# Patient Record
Sex: FEMALE | Race: OTHER | URBAN - METROPOLITAN AREA
[De-identification: names, ages, dates, MRNs, and addresses within clinical notes are randomized per-mention and may not be internally consistent; named-entity substitution may affect disease eponyms.]

---

## 2024-03-20 ENCOUNTER — OFFICE (OUTPATIENT)
Dept: URBAN - METROPOLITAN AREA CLINIC 103 | Facility: CLINIC | Age: 64
Setting detail: OPHTHALMOLOGY
End: 2024-03-20
Payer: MEDICARE

## 2024-03-20 DIAGNOSIS — H25.13: ICD-10-CM

## 2024-03-20 DIAGNOSIS — H16.223: ICD-10-CM

## 2024-03-20 DIAGNOSIS — H40.033: ICD-10-CM

## 2024-03-20 DIAGNOSIS — H35.3131: ICD-10-CM

## 2024-03-20 DIAGNOSIS — H52.7: ICD-10-CM

## 2024-03-20 DIAGNOSIS — H43.813: ICD-10-CM

## 2024-03-20 PROCEDURE — 92014 COMPRE OPH EXAM EST PT 1/>: CPT | Performed by: OPHTHALMOLOGY

## 2024-03-20 PROCEDURE — 92134 CPTRZ OPH DX IMG PST SGM RTA: CPT | Performed by: OPHTHALMOLOGY

## 2024-03-20 PROCEDURE — 92015 DETERMINE REFRACTIVE STATE: CPT | Performed by: OPHTHALMOLOGY

## 2024-03-20 PROCEDURE — 92020 GONIOSCOPY: CPT | Performed by: OPHTHALMOLOGY

## 2024-03-20 ASSESSMENT — REFRACTION_CURRENTRX
OS_VPRISM_DIRECTION: SV
OS_CYLINDER: -0.25
OD_SPHERE: +1.50
OS_OVR_VA: 20/
OD_VPRISM_DIRECTION: SV
OD_OVR_VA: 20/
OS_AXIS: 017
OS_SPHERE: +1.75

## 2024-03-20 ASSESSMENT — REFRACTION_MANIFEST
OS_SPHERE: +1.50
OD_CYLINDER: -0.50
OS_CYLINDER: SPHERE
OD_VA1: 20/20
OS_ADD: +2.50
OU_VA: 20/20
OD_VA2: 20/20
OD_AXIS: 090
OS_CYLINDER: SPHERE
OS_SPHERE: +1.75
OS_VA2: 20/20
OD_CYLINDER: SPHERE
OS_VA1: 20/20
OD_SPHERE: +1.75
OD_ADD: +2.50
OS_VA1: 20/25
OS_ADD: +2.75
OD_ADD: +2.75
OD_VA1: 20/25
OD_SPHERE: +1.75

## 2024-03-20 ASSESSMENT — SPHEQUIV_DERIVED: OD_SPHEQUIV: 1.5

## 2024-04-10 PROBLEM — Z00.00 ENCOUNTER FOR PREVENTIVE HEALTH EXAMINATION: Status: ACTIVE | Noted: 2024-04-10

## 2024-04-11 ENCOUNTER — APPOINTMENT (OUTPATIENT)
Dept: ORTHOPEDIC SURGERY | Facility: CLINIC | Age: 64
End: 2024-04-11
Payer: MEDICARE

## 2024-04-11 DIAGNOSIS — Z78.9 OTHER SPECIFIED HEALTH STATUS: ICD-10-CM

## 2024-04-11 DIAGNOSIS — S83.282A OTHER TEAR OF LATERAL MENISCUS, CURRENT INJURY, LEFT KNEE, INITIAL ENCOUNTER: ICD-10-CM

## 2024-04-11 PROCEDURE — 20610 DRAIN/INJ JOINT/BURSA W/O US: CPT | Mod: LT

## 2024-04-11 PROCEDURE — 99203 OFFICE O/P NEW LOW 30 MIN: CPT | Mod: 25

## 2024-04-11 NOTE — HISTORY OF PRESENT ILLNESS
[8] : 8 [5] : 5 [Sharp] : sharp [Constant] : constant [Leisure] : leisure [Rest] : rest [Walking] : walking [de-identified] : Patient is here today for the left knee. States her knee has been bothering her for 6 months NKI. Reports a sharp lateral pain on her left knee. Patient states she is constantly in pain. Patient has ZP Xray from 02/02/2024. Denies any pervious treatment for her knee.  [] : Patient is currently injured and not playing sports: no [FreeTextEntry1] : left knee

## 2024-04-11 NOTE — PHYSICAL EXAM
[de-identified] : Examination of the left knee is as follows: INSPECTION: mild effusion, but no ecchymosis, no erythema, no atrophy, no deformities of quad tendon or of patellar tendon PALPATION: lateral joint line tenderness, but no palpable mass, no obvious defects, no increased warmth, no calf tenderness ROM: flexion 125 degrees, but extension 0 degrees STRENGTH: quadriceps 5/5, hamstring 5/5 TESTING: ligamentously stable NEURO: light touch is intact throughout GAIT: antalgic, but patient ambulates without assistive device  X-rays of the left knee is as follows:  Knee 3 view in the anteroposterior, lateral, skyline views: joints preserved, no acute osseous abnormalities.

## 2024-04-11 NOTE — ASSESSMENT
[FreeTextEntry1] : 62yo female with left knee lateral joint line pain for past 6 months with mechanical symptoms of locking, clicking.  Recommend nonsurgical management with CSI today and PT rx.  -Discussed risks, benefits, alternatives of left knee intraarticular CSI, patient tolerated well Large joint injection was performed of the left knee. The indication for this procedure was pain, inflammation and x-ray evidence of Osteoarthritis on this or prior visit. The site was prepped with alcohol. An injection of Betamethasone (Celestone) 1cc of 3mg, Lidocaine 7cc of 1%  was used. Patient tolerated procedure well. Patient was advised to call if redness, pain or fever occur and apply ice for 15 minutes out of every hour for the next 12-24 hours as tolerated. Patient has tried OTC's including aspirin, Ibuprofen, Aleve, etc or prescription NSAIDS, and/or exercises at home and/or physical therapy without satisfactory response, patient had decreased mobility in the joint and the risks benefits, and alternatives have been discussed, and verbal consent was obtained.  -Activities as tolerated -Rest, ice, compression, elevation, NSAIDs PRN for pain.  -All questions answered -F/u 6 weeks.  if symptoms persist will recommend MRI with possible plan for knee scope.  The diagnosis was explained in detail. The potential non-surgical and surgical treatments were reviewed. The relative risks and benefits of each option were considered relative to the patients age, activity level, medical history, symptom severity and previously attempted treatments.  The patient was advised to consult with their primary medical provider prior to initiation of any new medications to reduce the risk of adverse effects specific to their long-term home medications and medical history. The risk of gastrointestinal irritation and kidney injury specific to long-term NSAID use was discussed.

## 2024-05-23 ENCOUNTER — APPOINTMENT (OUTPATIENT)
Dept: ORTHOPEDIC SURGERY | Facility: CLINIC | Age: 64
End: 2024-05-23

## 2024-06-07 ENCOUNTER — APPOINTMENT (OUTPATIENT)
Dept: ORTHOPEDIC SURGERY | Facility: CLINIC | Age: 64
End: 2024-06-07
Payer: MEDICARE

## 2024-06-07 VITALS — HEIGHT: 63 IN | WEIGHT: 175 LBS | BODY MASS INDEX: 31.01 KG/M2

## 2024-06-07 DIAGNOSIS — M23.92 UNSPECIFIED INTERNAL DERANGEMENT OF LEFT KNEE: ICD-10-CM

## 2024-06-07 PROCEDURE — 99213 OFFICE O/P EST LOW 20 MIN: CPT

## 2024-06-07 NOTE — ASSESSMENT
[FreeTextEntry1] : 62yo female presenting for f/u of left knee internal derangement with lateral joint line tenderness for 6 months. Patient has failed 6 weeks of non-surgical management consistent of PT, activity modifications, OTC NSAIDs, activity modifications, intraarticular csi and continues to have persistent pain that is interfering with her activities of daily living. Patient speaks Nepali,  # [id number] used for translation of today's visit. -Rx MRI left knee w/o contrast to r/o meniscus tear and plan for possible surgical intervention -Activities as tolerated -Rest, ice, compression, elevation, NSAIDs PRN for pain.  -All questions answered -F/u after MRI  The diagnosis was explained in detail. The potential non-surgical and surgical treatments were reviewed. The relative risks and benefits of each option were considered relative to the patients age, activity level, medical history, symptom severity and previously attempted treatments.  The patient was advised to consult with their primary medical provider prior to initiation of any new medications to reduce the risk of adverse effects specific to their long-term home medications and medical history. The risk of gastrointestinal irritation and kidney injury specific to long-term NSAID use was discussed.  Entered by George Schmitz PA-C acting as scribe. Dr. Rodgers Attestation The documentation recorded by the scribe, in my presence, accurately reflects the service I, Dr. Rodgers, personally performed, and the decisions made by me with my edits as appropriate.

## 2024-06-07 NOTE — PHYSICAL EXAM
[de-identified] : Examination of the left knee is as follows: INSPECTION: mild effusion, but no ecchymosis, no erythema, no atrophy, no deformities of quad tendon or of patellar tendon PALPATION: lateral joint line tenderness, but no palpable mass, no obvious defects, no increased warmth, no calf tenderness ROM: flexion 125 degrees, but extension 0 degrees STRENGTH: quadriceps 5/5, hamstring 5/5 TESTING: + mcmurrays NEURO: light touch is intact throughout GAIT: antalgic, but patient ambulates without assistive device  X-rays of the left knee is as follows:  Knee 3 view in the anteroposterior, lateral, skyline views: joints preserved, no acute osseous abnormalities.

## 2024-06-07 NOTE — HISTORY OF PRESENT ILLNESS
[de-identified] : Patient is here today for the left knee. Patient had CSI on 4/11/24. Patient states the injection did not help. Patient states she has occasional aching pain when getting up from chairs or when walking up/downstairs. Patient states that she is going to PT 2x a week with some relief. Patient states pain is right in the patella.  [Sudden] : sudden [10] : 10 [0] : 0 [Dull/Aching] : dull/aching [Throbbing] : throbbing [Intermittent] : intermittent [Leisure] : leisure [Rest] : rest [Disabled] : Work status: disabled [] : Post Surgical Visit: no [FreeTextEntry1] : Left knee [FreeTextEntry3] : 6 months ago  [FreeTextEntry5] : NKI [de-identified] : Movement  [de-identified] : 4/11/24 [de-identified] : Left kne [de-identified] : Cortisone

## 2024-07-02 ENCOUNTER — RESULT REVIEW (OUTPATIENT)
Age: 64
End: 2024-07-02

## 2024-07-09 ENCOUNTER — NON-APPOINTMENT (OUTPATIENT)
Age: 64
End: 2024-07-09

## 2024-07-16 ENCOUNTER — APPOINTMENT (OUTPATIENT)
Dept: ORTHOPEDIC SURGERY | Facility: CLINIC | Age: 64
End: 2024-07-16
Payer: MEDICARE

## 2024-07-16 DIAGNOSIS — S83.282A OTHER TEAR OF LATERAL MENISCUS, CURRENT INJURY, LEFT KNEE, INITIAL ENCOUNTER: ICD-10-CM

## 2024-07-16 PROCEDURE — 20610 DRAIN/INJ JOINT/BURSA W/O US: CPT | Mod: LT

## 2024-07-16 PROCEDURE — 99215 OFFICE O/P EST HI 40 MIN: CPT | Mod: 25

## 2025-03-07 ENCOUNTER — APPOINTMENT (OUTPATIENT)
Dept: ORTHOPEDIC SURGERY | Facility: CLINIC | Age: 65
End: 2025-03-07
Payer: MEDICARE

## 2025-03-07 DIAGNOSIS — M23.92 UNSPECIFIED INTERNAL DERANGEMENT OF LEFT KNEE: ICD-10-CM

## 2025-03-07 DIAGNOSIS — S83.282A OTHER TEAR OF LATERAL MENISCUS, CURRENT INJURY, LEFT KNEE, INITIAL ENCOUNTER: ICD-10-CM

## 2025-03-07 PROCEDURE — 99213 OFFICE O/P EST LOW 20 MIN: CPT

## 2025-04-17 DIAGNOSIS — S83.282D OTHER TEAR OF LATERAL MENISCUS, CURRENT INJURY, LEFT KNEE, SUBSEQUENT ENCOUNTER: ICD-10-CM

## 2025-04-22 ENCOUNTER — APPOINTMENT (OUTPATIENT)
Dept: ORTHOPEDIC SURGERY | Facility: CLINIC | Age: 65
End: 2025-04-22

## 2025-06-30 ENCOUNTER — OFFICE (OUTPATIENT)
Dept: URBAN - METROPOLITAN AREA CLINIC 103 | Facility: CLINIC | Age: 65
Setting detail: OPHTHALMOLOGY
End: 2025-06-30
Payer: COMMERCIAL

## 2025-06-30 DIAGNOSIS — H35.3131: ICD-10-CM

## 2025-06-30 DIAGNOSIS — H16.223: ICD-10-CM

## 2025-06-30 DIAGNOSIS — H52.4: ICD-10-CM

## 2025-06-30 DIAGNOSIS — H43.813: ICD-10-CM

## 2025-06-30 DIAGNOSIS — H25.13: ICD-10-CM

## 2025-06-30 DIAGNOSIS — H40.033: ICD-10-CM

## 2025-06-30 PROCEDURE — 92020 GONIOSCOPY: CPT | Performed by: OPHTHALMOLOGY

## 2025-06-30 PROCEDURE — 92014 COMPRE OPH EXAM EST PT 1/>: CPT | Performed by: OPHTHALMOLOGY

## 2025-06-30 PROCEDURE — 92015 DETERMINE REFRACTIVE STATE: CPT | Performed by: OPHTHALMOLOGY

## 2025-06-30 PROCEDURE — 92250 FUNDUS PHOTOGRAPHY W/I&R: CPT | Performed by: OPHTHALMOLOGY

## 2025-06-30 ASSESSMENT — VISUAL ACUITY
OS_BCVA: 20/40
OD_BCVA: 20/40-2

## 2025-06-30 ASSESSMENT — REFRACTION_MANIFEST
OD_CYLINDER: -1.00
OD_AXIS: 090
OD_AXIS: 090
OD_VA1: 20/25
OD_VA2: 20/20
OS_CYLINDER: SPHERE
OD_ADD: +2.75
OU_VA: 20/20
OD_CYLINDER: -0.50
OD_VA1: 20/25
OS_VA1: 20/25
OS_SPHERE: +2.25
OD_SPHERE: +2.25
OS_AXIS: 100
OD_SPHERE: +1.75
OD_ADD: +2.50
OS_VA2: 20/20
OS_ADD: +2.75
OS_SPHERE: +1.50
OS_ADD: +2.50
OS_VA1: 20/25
OS_CYLINDER: -1.00

## 2025-06-30 ASSESSMENT — REFRACTION_CURRENTRX
OD_SPHERE: +1.50
OS_SPHERE: +1.75
OS_OVR_VA: 20/
OD_CYLINDER: SPH
OD_SPHERE: +2.00
OS_VPRISM_DIRECTION: SV
OD_VPRISM_DIRECTION: SV
OD_VPRISM_DIRECTION: SV
OS_CYLINDER: -0.25
OS_VPRISM_DIRECTION: SV
OD_OVR_VA: 20/
OS_AXIS: 017
OS_SPHERE: +2.00
OS_OVR_VA: 20/
OS_CYLINDER: SPH
OD_OVR_VA: 20/

## 2025-06-30 ASSESSMENT — PACHYMETRY
OS_CT_CORRECTION: -2
OS_CT_UM: 577
OD_CT_CORRECTION: -2
OD_CT_UM: 574

## 2025-06-30 ASSESSMENT — TONOMETRY
OD_IOP_MMHG: 18
OS_IOP_MMHG: 20